# Patient Record
Sex: FEMALE | Race: WHITE
[De-identification: names, ages, dates, MRNs, and addresses within clinical notes are randomized per-mention and may not be internally consistent; named-entity substitution may affect disease eponyms.]

---

## 2017-10-08 ENCOUNTER — HOSPITAL ENCOUNTER (EMERGENCY)
Dept: HOSPITAL 76 - ED | Age: 27
Discharge: HOME | End: 2017-10-08
Payer: MEDICAID

## 2017-10-08 VITALS — SYSTOLIC BLOOD PRESSURE: 106 MMHG | DIASTOLIC BLOOD PRESSURE: 61 MMHG

## 2017-10-08 DIAGNOSIS — H10.32: ICD-10-CM

## 2017-10-08 DIAGNOSIS — B96.89: ICD-10-CM

## 2017-10-08 DIAGNOSIS — J06.9: Primary | ICD-10-CM

## 2017-10-08 DIAGNOSIS — J01.00: ICD-10-CM

## 2017-10-08 PROCEDURE — 99283 EMERGENCY DEPT VISIT LOW MDM: CPT

## 2017-10-08 NOTE — ED PHYSICIAN DOCUMENTATION
PD HPI HEENT





- Stated complaint


Stated Complaint: LEFT EYE PAIN,LEFT EAR ACHE





- Chief complaint


Chief Complaint: General





- History obtained from


History obtained from: Patient





- History of Present Illness


Timing - onset: How many days ago (4)


Timing - duration: Days (4)


Timing - details: Gradual onset, Still present


Location: Left ear, Sinuses, Other (left eye discharge today)


Associated symptoms: Fever, Congestion, Other (left eye discharge).  No: Facial 

swelling


Recently seen: Not recently seen





Review of Systems


Constitutional: reports: Fever.  denies: Chills


Ears: reports: Ear pain.  denies: Loss of hearing, Drainage/discharge, Tinnitus/

ringing


Nose: reports: Rhinorrhea / runny nose, Sinus pressure / pain (left maxillary)


Throat: denies: Dental pain / toothache, Sore throat


Respiratory: denies: Cough





PD PAST MEDICAL HISTORY





- Past Medical History


Past Medical History: No





- Past Surgical History


Past Surgical History: No





- Present Medications


Home Medications: 


 Ambulatory Orders











 Medication  Instructions  Recorded  Confirmed


 


Cephalexin [Keflex] 500 mg PO TID #20 capsule 10/08/17 


 


Cetirizine [ZyrTEC] 20 mg PO DAILY 10/08/17 10/08/17


 


Dexamethasone [Decadron] 4 mg PO DAILY #5 tablet 10/08/17 














- Allergies


Allergies/Adverse Reactions: 


 Allergies











Allergy/AdvReac Type Severity Reaction Status Date / Time


 


amoxicillin Allergy  Hives Verified 10/08/17 21:36


 


coconut oil Allergy  Hives Verified 10/08/17 21:36














- Social History


Does the pt smoke?: No


Smoking Status: Never smoker


Does the pt drink ETOH?: No


Does the pt have substance abuse?: No





- Immunizations


Immunizations are current?: Yes





PD ED PE NORMAL





- Vitals


Vital signs reviewed: Yes





- General


General: Alert and oriented X 3, No acute distress, Well developed/nourished





- HEENT


HEENT: PERRL, EOMI (left eye with conjunctival redness and purulent crusting/

matting. ), Ears normal, Pharynx benign





- Neck


Neck: Supple, no meningeal sign, No adenopathy





- Cardiac


Cardiac: RRR, No murmur





- Respiratory


Respiratory: Clear bilaterally





Results





- Vitals


Vitals: 


 Oxygen











O2 Source                      Room air

















PD MEDICAL DECISION MAKING





- ED course


Complexity details: considered differential, d/w patient





Departure





- Departure


Disposition: 01 Home, Self Care


Clinical Impression: 


Upper respiratory infection


Qualifiers:


 URI type: unspecified URI Qualified Code(s): J06.9 - Acute upper respiratory 

infection, unspecified





Conjunctivitis, acute


Qualifiers:


 Acute conjunctivitis type: bacterial Laterality: left Qualified Code(s): 

H10.32 - Unspecified acute conjunctivitis, left eye





Sinusitis, acute maxillary


Qualifiers:


 Recurrence: non-recurrent Qualified Code(s): J01.00 - Acute maxillary sinusitis

, unspecified





Condition: Stable


Record reviewed to determine appropriate education?: Yes


Instructions:  ED Conjunctivitis Bacterial, ED Sinusitis Abx Tx


Prescriptions: 


Cephalexin [Keflex] 500 mg PO TID #20 capsule


Dexamethasone [Decadron] 4 mg PO DAILY #5 tablet


Comments: 


 It sounds like generally an upper respiratory infection with then secondarily 

a sinus infection likely and pinkeye for sure.  Use the antibiotic eyedrops in 

the left eye every 2-3 hours while awake for the next several days until 

cleared.  For the inflammation through the sinuses and bronchioles, he can use 

dexamethasone daily for the next several days until improved.  Drink lots of 

fluids.  Tylenol or ibuprofen if needed for fevers and pains.  As it does sound 

like some sinus infection as well, use cephalexin 3 times daily for the next 5-

7 days until it seems cleared.  Recheck if not improving over the next 2-3 

days.  You will be okay to resume work after a day on the antibiotic eyedrops 

regarding the pinkeye so okay to resume work on Tuesday.


Discharge Date/Time: 10/08/17 22:50

## 2018-05-04 ENCOUNTER — HOSPITAL ENCOUNTER (EMERGENCY)
Dept: HOSPITAL 76 - ED | Age: 28
Discharge: HOME | End: 2018-05-04
Payer: MEDICAID

## 2018-05-04 VITALS — SYSTOLIC BLOOD PRESSURE: 125 MMHG | DIASTOLIC BLOOD PRESSURE: 84 MMHG

## 2018-05-04 DIAGNOSIS — H66.001: Primary | ICD-10-CM

## 2018-05-04 DIAGNOSIS — M79.7: ICD-10-CM

## 2018-05-04 PROCEDURE — 99283 EMERGENCY DEPT VISIT LOW MDM: CPT

## 2018-09-15 ENCOUNTER — HOSPITAL ENCOUNTER (OUTPATIENT)
Dept: HOSPITAL 76 - DI | Age: 28
Discharge: HOME | End: 2018-09-15
Attending: ORAL & MAXILLOFACIAL SURGERY
Payer: MEDICAID

## 2018-09-15 DIAGNOSIS — J31.0: Primary | ICD-10-CM

## 2018-09-15 DIAGNOSIS — R51: ICD-10-CM

## 2018-09-15 PROCEDURE — 70486 CT MAXILLOFACIAL W/O DYE: CPT

## 2018-09-15 NOTE — CT REPORT
Reason:  RHINITIS, FACIAL PAIN

Procedure Date:  09/15/2018   

Accession Number:  [id number] / [id number]                    

Procedure:  CT  - Sinuses CPT Code:  

 

FULL RESULT:

 

 

EXAM:

CT SINUS

 

EXAM DATE: 9/15/2018 03:42 PM.

 

HISTORY: Rhinitis. Facial and nasal pain.

 

COMPARISONS: None.

 

TECHNIQUE: Routine multi-axial CT imaging performed through the sinuses. 

Iodinated IV contrast: None. Reconstructions: Coronal.

 

In accordance with CT protocol optimization, one or more of the following 

dose reduction techniques were utilized for this exam: automated exposure 

control, adjustment of mA and/or KV based on patient size, or use of 

iterative reconstructive technique.

 

FINDINGS:

RIGHT

Frontal: Normal.

Ethmoid: Normal.

Maxillary: Normal.

Sphenoid: Normal.

Drainage Pathways: The frontal recess, ostiomeatal complex and 

sphenoethmoidal recess are patent and normal.

 

LEFT

Frontal: Normal.

Ethmoid: Normal.

Maxillary: Normal.

Sphenoid: Normal.

Drainage Pathways: The frontal recess, ostiomeatal complex and 

sphenoethmoidal recess are patent and normal.

 

Nasal Cavity: Normal. No mass or significant anatomic abnormality evident.

 

Osseous Structures: Unremarkable.

 

Orbits: Unremarkable.

 

Other: None.

IMPRESSION: Normal Sinus CT. No sinusitis.

 

RADIA

## 2018-11-04 ENCOUNTER — HOSPITAL ENCOUNTER (OUTPATIENT)
Dept: HOSPITAL 76 - ED | Age: 28
Setting detail: OBSERVATION
LOS: 1 days | Discharge: HOME | End: 2018-11-05
Attending: NURSE PRACTITIONER | Admitting: INTERNAL MEDICINE
Payer: MEDICAID

## 2018-11-04 DIAGNOSIS — R10.31: Primary | ICD-10-CM

## 2018-11-04 DIAGNOSIS — E87.6: ICD-10-CM

## 2018-11-04 LAB
ALBUMIN DIAFP-MCNC: 4.4 G/DL (ref 3.2–5.5)
ALBUMIN/GLOB SERPL: 1.4 {RATIO} (ref 1–2.2)
ALP SERPL-CCNC: 61 IU/L (ref 42–121)
ALT SERPL W P-5'-P-CCNC: 16 IU/L (ref 10–60)
ANION GAP SERPL CALCULATED.4IONS-SCNC: 8 MMOL/L (ref 6–13)
AST SERPL W P-5'-P-CCNC: 21 IU/L (ref 10–42)
BASOPHILS NFR BLD AUTO: 0.1 10^3/UL (ref 0–0.1)
BASOPHILS NFR BLD AUTO: 0.7 %
BILIRUB BLD-MCNC: 0.6 MG/DL (ref 0.2–1)
BUN SERPL-MCNC: 13 MG/DL (ref 6–20)
CALCIUM UR-MCNC: 9 MG/DL (ref 8.5–10.3)
CHLORIDE SERPL-SCNC: 106 MMOL/L (ref 101–111)
CLARITY UR REFRACT.AUTO: CLEAR
CO2 SERPL-SCNC: 24 MMOL/L (ref 21–32)
CREAT SERPLBLD-SCNC: 0.6 MG/DL (ref 0.4–1)
EOSINOPHIL # BLD AUTO: 0.1 10^3/UL (ref 0–0.7)
EOSINOPHIL NFR BLD AUTO: 0.6 %
ERYTHROCYTE [DISTWIDTH] IN BLOOD BY AUTOMATED COUNT: 13.2 % (ref 12–15)
GFRSERPLBLD MDRD-ARVRAT: 117 ML/MIN/{1.73_M2} (ref 89–?)
GLOBULIN SER-MCNC: 3.1 G/DL (ref 2.1–4.2)
GLUCOSE SERPL-MCNC: 112 MG/DL (ref 70–100)
GLUCOSE UR QL STRIP.AUTO: NEGATIVE MG/DL
HCG UR QL: NEGATIVE
HGB UR QL STRIP: 13.6 G/DL (ref 12–16)
KETONES UR QL STRIP.AUTO: NEGATIVE MG/DL
LIPASE SERPL-CCNC: 36 U/L (ref 22–51)
LYMPHOCYTES # SPEC AUTO: 2.9 10^3/UL (ref 1.5–3.5)
LYMPHOCYTES NFR BLD AUTO: 27.2 %
MCH RBC QN AUTO: 30.2 PG (ref 27–31)
MCHC RBC AUTO-ENTMCNC: 33.8 G/DL (ref 32–36)
MCV RBC AUTO: 89.3 FL (ref 81–99)
MONOCYTES # BLD AUTO: 0.5 10^3/UL (ref 0–1)
MONOCYTES NFR BLD AUTO: 4.8 %
NEUTROPHILS # BLD AUTO: 7 10^3/UL (ref 1.5–6.6)
NEUTROPHILS # SNV AUTO: 10.5 X10^3/UL (ref 4.8–10.8)
NEUTROPHILS NFR BLD AUTO: 66.7 %
NITRITE UR QL STRIP.AUTO: NEGATIVE
PDW BLD AUTO: 8.7 FL (ref 7.9–10.8)
PH UR STRIP.AUTO: 6 PH (ref 5–7.5)
PLATELET # BLD: 283 10^3/UL (ref 130–450)
PROT SPEC-MCNC: 7.5 G/DL (ref 6.7–8.2)
PROT UR STRIP.AUTO-MCNC: NEGATIVE MG/DL
RBC # UR STRIP.AUTO: (no result) /UL
RBC MAR: 4.51 10^6/UL (ref 4.2–5.4)
SODIUM SERPLBLD-SCNC: 138 MMOL/L (ref 135–145)
SP GR UR STRIP.AUTO: 1.01 (ref 1–1.03)
UROBILINOGEN UR QL STRIP.AUTO: (no result) E.U./DL
UROBILINOGEN UR STRIP.AUTO-MCNC: NEGATIVE MG/DL

## 2018-11-04 PROCEDURE — 85018 HEMOGLOBIN: CPT

## 2018-11-04 PROCEDURE — 76705 ECHO EXAM OF ABDOMEN: CPT

## 2018-11-04 PROCEDURE — 84484 ASSAY OF TROPONIN QUANT: CPT

## 2018-11-04 PROCEDURE — 87086 URINE CULTURE/COLONY COUNT: CPT

## 2018-11-04 PROCEDURE — 99285 EMERGENCY DEPT VISIT HI MDM: CPT

## 2018-11-04 PROCEDURE — 83605 ASSAY OF LACTIC ACID: CPT

## 2018-11-04 PROCEDURE — 87040 BLOOD CULTURE FOR BACTERIA: CPT

## 2018-11-04 PROCEDURE — 99283 EMERGENCY DEPT VISIT LOW MDM: CPT

## 2018-11-04 PROCEDURE — 84100 ASSAY OF PHOSPHORUS: CPT

## 2018-11-04 PROCEDURE — 74177 CT ABD & PELVIS W/CONTRAST: CPT

## 2018-11-04 PROCEDURE — 96366 THER/PROPH/DIAG IV INF ADDON: CPT

## 2018-11-04 PROCEDURE — 74176 CT ABD & PELVIS W/O CONTRAST: CPT

## 2018-11-04 PROCEDURE — 96372 THER/PROPH/DIAG INJ SC/IM: CPT

## 2018-11-04 PROCEDURE — 80053 COMPREHEN METABOLIC PANEL: CPT

## 2018-11-04 PROCEDURE — 85025 COMPLETE CBC W/AUTO DIFF WBC: CPT

## 2018-11-04 PROCEDURE — 93005 ELECTROCARDIOGRAM TRACING: CPT

## 2018-11-04 PROCEDURE — 83690 ASSAY OF LIPASE: CPT

## 2018-11-04 PROCEDURE — 81003 URINALYSIS AUTO W/O SCOPE: CPT

## 2018-11-04 PROCEDURE — 85014 HEMATOCRIT: CPT

## 2018-11-04 PROCEDURE — 96375 TX/PRO/DX INJ NEW DRUG ADDON: CPT

## 2018-11-04 PROCEDURE — 85651 RBC SED RATE NONAUTOMATED: CPT

## 2018-11-04 PROCEDURE — 86140 C-REACTIVE PROTEIN: CPT

## 2018-11-04 PROCEDURE — 36415 COLL VENOUS BLD VENIPUNCTURE: CPT

## 2018-11-04 PROCEDURE — 99284 EMERGENCY DEPT VISIT MOD MDM: CPT

## 2018-11-04 PROCEDURE — 96376 TX/PRO/DX INJ SAME DRUG ADON: CPT

## 2018-11-04 PROCEDURE — 96367 TX/PROPH/DG ADDL SEQ IV INF: CPT

## 2018-11-04 PROCEDURE — 85610 PROTHROMBIN TIME: CPT

## 2018-11-04 PROCEDURE — 82272 OCCULT BLD FECES 1-3 TESTS: CPT

## 2018-11-04 PROCEDURE — 96365 THER/PROPH/DIAG IV INF INIT: CPT

## 2018-11-04 PROCEDURE — 71275 CT ANGIOGRAPHY CHEST: CPT

## 2018-11-04 PROCEDURE — 81025 URINE PREGNANCY TEST: CPT

## 2018-11-04 PROCEDURE — 81001 URINALYSIS AUTO W/SCOPE: CPT

## 2018-11-04 PROCEDURE — 82150 ASSAY OF AMYLASE: CPT

## 2018-11-04 PROCEDURE — 96361 HYDRATE IV INFUSION ADD-ON: CPT

## 2018-11-04 PROCEDURE — 83735 ASSAY OF MAGNESIUM: CPT

## 2018-11-04 RX ADMIN — SODIUM CHLORIDE AND POTASSIUM CHLORIDE SCH MLS/HR: 9; 1.49 INJECTION, SOLUTION INTRAVENOUS at 23:01

## 2018-11-04 NOTE — HISTORY & PHYSICAL EXAMINATION
Chief Complaint





- Chief Complaint


Chief Complaint: Abdominal Pain





History of Present Illness





- Admitted From


Admitted From:: Emergency Department





- History Obtained From


Records Reviewed: Yes


History obtained from: Patient


Exam Limitations: None





- History of Present Illness


HPI Comment/Other: 





Patient is a very healthy 28-year-old female with past medical history of 

allergic sinusitis who presented to the emergency department with a chief 

complaint of abdominal pain.  The patient states that the abdominal pain started

all of a sudden this afternoon while she was driving back to her home from 

[place], [place].  She states that prior to this she had noticed some 

right lower quadrant aching which had been going on off and on for several 

months but she did not make much of it.  She also states that she has noticed 

that over the last month and a half she has been having blood in her stools.  

She states that it is right red blood but she has normal formed bowel movements 

with the blood in her stool.  The patient denies any diarrhea or constipation.  

She does state that the blood has become more pronounced over the last 3 days.  

She states that today she had an acute onset of severe right lower quadrant ab

dominal pain.  She states it initially started as an ache similar to what she 

has had in the past but then became sharp and severe.  She states that she had 

to pull over in the side of the road while she was driving because the pain was 

so severe.  She states that she had cold sweats and nausea associated with the 

pain.  She states that the pain almost brought her to tears.  She states it was 

a 10 out of 10 pain.  She states that nothing that she did helped to improve the

pain.  She states that somehow she was able to drive herself back to [place] 

and then come into the emergency department.  She denies any fevers or chills.  

She denies any history of bowel issues.  She denies any have family history of 

irritable bowel or inflammatory bowel syndrome.  The patient denies eating any 

unusual foods over the last 24 hours.  She states that the pain occurred several

hours after she ate.  She states that the pain remained constant until she 

arrived in the emergency department and was given pain medication.





Patient denies any headaches, blurred vision, runny nose, sore throat, nasal 

congestion, difficulty swallowing, changes in her appetite, recent unintentional

weight loss, shortness of air, orthopnea, PND, cough, increased lower extremity 

swelling, chest pain, vomiting, constipation, diarrhea, black stools, urinary 

urgency, urinary frequency, dysuria, joint pain, joint swelling, muscle aches, 

back pain, neck stiffness, lightheadedness, dizziness, ringing in her ears, 

recent stress or depression, night sweats or any focal neurologic deficits.





On presentation to the emergency department the patient was afebrile and 

slightly hypertensive with a blood pressure of 139/75 otherwise she had no 

respiratory distress and was saturating 100% on room air.  The patient did 

appear to be writhing in pain and immediately was given a dose of IV Toradol.  

The patient underwent routine lab work which revealed a mild hypokalemia but 

remainder of her lab work was all within normal limits.  The patient had a 

negative troponin and normal C-reactive protein, LFTs and ESR.  The patient's w

eugene blood cell count was 10.5.  The patient's urine analysis was negative.  The

patient underwent multiple imaging studies including a CT of her abdomen and 

pelvis without contrast which was unremarkable.  She then underwent an abdominal

ultrasound which was also unremarkable.  She then underwent a CT with contrast 

of her thorax and her abdomen and pelvis this was also unremarkable.  The 

patient continued to have abdominal pain and required 2 additional doses of IV 

Dilaudid but pain persisted.  At this point the emergency room physician became 

concerned for possible inflammatory bowel disease in a patient who is been 

having hematochezia and now has acute onset of abdominal pain.  The emergency 

room physician contacted the surgeon on call Dr. Metz who recommended that 

the patient be placed in observation and admitted by the hospitalist team and 

that he would see the patient in the morning in consultation.  He also 

recommended giving her a dose of antibiotics in the emergency department.  The 

patient was given IV ceftriaxone and Flagyl in the emergency department.  The 

patient was placed in observation for abdominal pain of unknown etiology.





History





- Past Medical History


Cardiovascular: reports: None


Respiratory: reports: None


Neuro: reports: None


Endocrine/Autoimmune: reports: None


GI: reports: Other (Hematochezia )


GYN: reports: None


: reports: None


HEENT: reports: None


Psych: reports: None


Musculoskeletal: reports: Fibromyalgia


MRSA Hx?: No


Other Past Medical History: Allergic sinusitis





- Family & Social History


Family History: Mother: Alcoholism, Mental Illness, Father: CAD (GF had CAD late

in life), Other family: CAD


Living arrangement: At home


Living Situation: With spouse/s.o.


Social History Notes: The patient lives in [place] with her boyfriend.  They 

do not have any children or any pets.  The patient is originally from [place]

but grew up in [place], [place].  She worked in behavioral health at 

 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  She states that she is very 

active and was running half marathons and marathons until the last year.  She 

states that her body tired out from running and that she has become less active 

now but still tries to take care of herself.  She denies any tobacco use, alco

hol use or any drug use.





- POLST


Patient has POLST: No


POLST Status: Full Code





Meds/Allgy





- Home Medications


Home Medications: 


                                Ambulatory Orders











 Medication  Instructions  Recorded  Confirmed


 


No Known Home Medications  11/04/18 11/04/18














- Allergies


Allergies/Adverse Reactions: 


                                    Allergies











Allergy/AdvReac Type Severity Reaction Status Date / Time


 


amoxicillin Allergy  Hives Verified 11/04/18 15:56


 


coconut oil Allergy  Hives Verified 11/04/18 15:56














Review of Systems





- Other Findings


Other Findings: 





A comprehensive review of systems was performed the pertinent positives and 

negatives are stated above in the HPI and the remainder of the review of systems

is negative.


Prior Level of Functionality: 





Patient is completely independent with all her activities of daily living.





Exam





- Vital Signs


Reviewed Vital Signs: Yes


Vital Signs: 





                                Vital Signs x48h











  Temp Pulse Resp BP Pulse Ox


 


 11/04/18 21:08   63  18  124/75  99


 


 11/04/18 19:34   70  18  115/73  100


 


 11/04/18 17:57   63  18  113/73  100


 


 11/04/18 16:54   59 L  18  111/72  100


 


 11/04/18 15:52  36.3 C L  72  18  139/75 H  100














- Physical Exam


General Appearance: positive: Alert, Moderate distress (Patient appears to be 

writhing in pain, uncomfortable, changing positions but unable to get 

comfortable)


Eyes Bilateral: positive: Normal inspection, PERRL, EOMI, No lid inflammation, 

Conjunctivae nml, No scleral icterus


ENT: positive: ENT inspection nml, Pharynx nml, Dry mucous membranes.  negative:

Purulent nasal drainage, Pharyngeal erythema, Oral lesions


Neck: positive: Nml inspection, Thyroid nml, No JVD, Trachea midline.  negative:

Thyromegaly, Lymphadenopathy (R), Lymphadenopathy (L), Stiff neck, Carotid 

bruit, Tracheal deviation


Respiratory: positive: Chest non-tender, No respiratory distress, Breath sounds 

nml.  negative: Wheezes, Rales, Rhonchi


Cardiovascular: positive: Regular rate & rhythm, No murmur, No gallop


Peripheral Pulses: positive: 2+


Abdomen: positive: No organomegaly, Nml bowel sounds, No distention, Tenderness 

(Mild RLQ tenderness, abd is soft, non distended without any peritoneal signs.).

 negative: Guarding, Rebound, Hepatomegaly


Back: positive: Nml inspection.  negative: CVA tenderness (R), CVA tenderness 

(L)


Skin: positive: Color nml, No rash, Warm, Dry.  negative: Cyanosis, Diaphoresis,

Pallor, Skin rash


Extremities: positive: Non-tender, Full ROM, Nml appearance, No pedal edema


Neurologic/Psychiatric: positive: Oriented x3, CN's nml (2-12), Motor nml, 

Sensation nml, Mood/affect nml





Conclusion/Plan





- Problem List


(1) Abdominal pain


Conclusion/Plan: 





Patient presented with abdominal pain with sudden onset 3 hours prior to arrival

to the emergency department. The pain is located in the right lower quadrant and

has been persistent since it started.  Patient had previous episodes of right 

lower quadrant aching pain which was short-lived and has been occurring off and 

on for the last few months. Patient underwent routine lab tests which were all 

within normal limits.  Patient underwent CT of the abdomen with and without 

contrast as well as CT of the chest with contrast and abdominal ultrasound.  All

studies were within normal limits. Patient also stated that she has had hema

tochezia for the last month and a half that is been worsening over the last 3 

days.  She continues to have formed stools however and has no diarrhea or 

constipation.  Patient had uncontrolled pain in the emergency department despite

several doses of IV pain medication the patient continued to have severe pain.  

The etiology of the patient's abdominal pain is unclear at this point.  Imaging 

studies have been negative and all her lab tests are within normal limits.  

Given her history of hematochezia I am concerned for possibility of inflammatory

bowel disease although this is an unusual presentation with normal ESR and CRP 

and normal imaging. Patient will need Colonoscopy and biopsy to confirm 

diagnosis.  I am also concerned about possibility of psychosomatic pain as the 

patient's pain appears out of proportion to examination and appears very 

inconsistent.  For example patient will be writhing in pain one moment and then 

speaking to me comfortably, laughing and smiling the next moment and then back 

in severe writhing pain the next moment.  It does not appear the patient has 

previous ER visits or hospitalizations for this kind of pain or any other pain 

issues.  She is not on any chronic pain medications and does not appear to be 

pain seeking. This is also in the setting of a patient who has a family history 

of mental health issues in her mother and grew up with an alcoholic mother which

was likely a traumatic experience for her.





Plan:


IV fluids


Repeat routine labs


Surgery consult


IV Dilaudid for pain control


IV antiemetics for nausea control


Consider outpatient psych assessment if no etiology found for patients pain. 


Qualifiers: 


   Abdominal location: right lower quadrant   Qualified Code(s): R10.31 - Right 

lower quadrant pain   





(2) Hypokalemia


Conclusion/Plan: 


On presentation to the emergency department the patient's potassium is 3.4.  It 

is likely decreased secondary to nausea.  We will replace the patient's 

potassium and monitor her potassium daily.








- Lab Results


Lab results reviewed: Yes


Fish Bones: 


                                 11/04/18 16:17





                                 11/04/18 16:17


Other Lab Results: 








                               Laboratory Results











WBC  10.5 x10^3/uL (4.8-10.8)   11/04/18  16:17    


 


RBC  4.51 10^6/uL (4.20-5.40)   11/04/18  16:17    


 


Hgb  13.6 g/dL (12.0-16.0)   11/04/18  16:17    


 


Hct  40.3 % (37.0-47.0)   11/04/18  16:17    


 


MCV  89.3 fL (81.0-99.0)   11/04/18  16:17    


 


MCH  30.2 pg (27.0-31.0)   11/04/18  16:17    


 


MCHC  33.8 g/dL (32.0-36.0)   11/04/18  16:17    


 


RDW  13.2 % (12.0-15.0)   11/04/18  16:17    


 


Plt Count  283 10^3/uL (130-450)   11/04/18  16:17    


 


MPV  8.7 fL (7.9-10.8)   11/04/18  16:17    


 


Neut # (Auto)  7.0 10^3/uL (1.5-6.6)  H  11/04/18  16:17    


 


Lymph # (Auto)  2.9 10^3/uL (1.5-3.5)   11/04/18  16:17    


 


Mono # (Auto)  0.5 10^3/uL (0.0-1.0)   11/04/18  16:17    


 


Eos # (Auto)  0.1 10^3/uL (0.0-0.7)   11/04/18  16:17    


 


Baso # (Auto)  0.1 10^3/uL (0.0-0.1)   11/04/18  16:17    


 


Absolute Nucleated RBC  0.01 x10^3/uL  11/04/18  16:17    


 


Nucleated RBC %  0.1 /100WBC  11/04/18  16:17    


 


Sodium  138 mmol/L (135-145)   11/04/18  16:17    


 


Potassium  3.4 mmol/L (3.5-5.0)  L  11/04/18  16:17    


 


Chloride  106 mmol/L (101-111)   11/04/18  16:17    


 


Carbon Dioxide  24 mmol/L (21-32)   11/04/18  16:17    


 


Anion Gap  8.0  (6-13)   11/04/18  16:17    


 


BUN  13 mg/dL (6-20)   11/04/18  16:17    


 


Creatinine  0.6 mg/dL (0.4-1.0)   11/04/18  16:17    


 


Estimated GFR (MDRD)  117  (>89)   11/04/18  16:17    


 


Glucose  112 mg/dL ()  H  11/04/18  16:17    


 


Calcium  9.0 mg/dL (8.5-10.3)   11/04/18  16:17    


 


Total Bilirubin  0.6 mg/dL (0.2-1.0)   11/04/18  16:17    


 


AST  21 IU/L (10-42)   11/04/18  16:17    


 


ALT  16 IU/L (10-60)   11/04/18  16:17    


 


Alkaline Phosphatase  61 IU/L ()   11/04/18  16:17    


 


Total Protein  7.5 g/dL (6.7-8.2)   11/04/18  16:17    


 


Albumin  4.4 g/dL (3.2-5.5)   11/04/18  16:17    


 


Globulin  3.1 g/dL (2.1-4.2)   11/04/18  16:17    


 


Albumin/Globulin Ratio  1.4  (1.0-2.2)   11/04/18  16:17    


 


Lipase  36 U/L (22-51)   11/04/18  16:17    


 


Urine Color  YELLOW   11/04/18  16:05    


 


Urine Clarity  CLEAR  (CLEAR)   11/04/18  16:05    


 


Urine pH  6.0 PH (5.0-7.5)   11/04/18  16:05    


 


Ur Specific Gravity  1.010  (1.002-1.030)   11/04/18  16:05    


 


Urine Protein  NEGATIVE mg/dL (NEGATIVE)   11/04/18  16:05    


 


Urine Glucose (UA)  NEGATIVE mg/dL (NEGATIVE)   11/04/18  16:05    


 


Urine Ketones  NEGATIVE mg/dL (NEGATIVE)   11/04/18  16:05    


 


Urine Occult Blood  TRACE-INTA  (NEGATIVE)   11/04/18  16:05    


 


Urine Nitrite  NEGATIVE  (NEGATIVE)   11/04/18  16:05    


 


Urine Bilirubin  NEGATIVE  (NEGATIVE)   11/04/18  16:05    


 


Urine Urobilinogen  0.2 (NORMAL) E.U./dL (NORMAL)   11/04/18  16:05    


 


Ur Leukocyte Esterase  NEGATIVE  (NEGATIVE)   11/04/18  16:05    


 


Ur Microscopic Review  NOT INDICATED   11/04/18  16:05    


 


Urine Culture Comments  NOT INDICATED   11/04/18  16:05    


 


Urine HCG, Qual  NEGATIVE   11/04/18  16:05    














- Diagnostic Imaging Results


Diagnostic Imaging Results: positive: Final report reviewed





- EKG Results


EKG Interpreted Independently: Yes





Core Measures





- Anticipated LOS


I expect patient to be DC'd or transferred within 96 hours.: Yes





- DVT/VTE - Prophylaxis


VTE/DVT Prophylaxis med ordered at admit?: Yes

## 2018-11-04 NOTE — CT REPORT
Reason:  R flank pain

Procedure Date:  11/04/2018   

Accession Number:  [id number] / [id number]                    

Procedure:  CT  - Abdomen/Pelvis W/O CPT Code:  

 

FULL RESULT:

 

 

EXAM:

CT ABDOMEN AND PELVIS

 

EXAM DATE: 11/4/2018 04:40 PM.

 

CLINICAL HISTORY: R flank pain.

 

COMPARISONS: None.

 

TECHNIQUE: Routine axial helical CT imaging was performed through the 

abdomen and pelvis without IV contrast. Reconstructions: Coronal and 

sagittal.

 

In accordance with CT protocol optimization, one or more of the following 

dose reduction techniques were utilized for this exam: automated exposure 

control, adjustment of mA and/or KV based on patient size, or use of 

iterative reconstructive technique.

 

FINDINGS:

Lung Bases: Unremarkable.

 

Abdominal Organs: Noncontrast images of the abdominal organs are grossly 

unremarkable.

 

Gallbladder/bile ducts: No significant abnormalities.

 

Peritoneal Cavity: No free fluid, free air or salma adenopathy. Bowel is 

grossly unremarkable.

 

Pelvic Organs: No bladder stones or wall thickening. Noncontrast images 

of the visualized pelvic organs are unremarkable.

 

Vasculature: Unremarkable.

 

Other: None.

IMPRESSION:

 Negative noncontrast CT of the abdomen and pelvis.

## 2018-11-04 NOTE — ED PHYSICIAN DOCUMENTATION
PD HPI ABD PAIN





- Stated complaint


Stated Complaint: RT UPPER BACK PX/NAUESA





- Chief complaint


Chief Complaint: Abd Pain





- History obtained from


History obtained from: Patient





- History of Present Illness


Timing - onset: Today (This is a healthy 28-year-old woman with no history of 

abdominal surgeries who developed right flank pain that was sudden in onset 

about 3 hours ago associated with nausea no vomiting.  She has chronic 

hematochezia which has been worse lately but is not new.  Prior to that today 

she had no abdominal pain and felt fine.  She is never had a kidney stone or 

biliary colic.  Last menses was a little under a month ago and normal bleed at 

this juncture.  She is sexually active.)





Review of Systems


Ten Systems: 10 systems reviewed and negative


Constitutional: denies: Fever, Chills


Cardiac: denies: Chest pain / pressure, Palpitations


Respiratory: denies: Dyspnea, Cough


GI: reports: Abdominal Pain, Nausea.  denies: Vomiting


: denies: Dysuria





PD PAST MEDICAL HISTORY





- Past Medical History


Musculoskeletal: Fibromyalgia





- Past Surgical History


Past Surgical History: No





- Present Medications


Home Medications: 


                                Ambulatory Orders











 Medication  Instructions  Recorded  Confirmed


 


No Known Home Medications  11/04/18 11/04/18














- Allergies


Allergies/Adverse Reactions: 


                                    Allergies











Allergy/AdvReac Type Severity Reaction Status Date / Time


 


amoxicillin Allergy  Hives Verified 11/04/18 15:56


 


coconut oil Allergy  Hives Verified 11/04/18 15:56














- Social History


Does the pt smoke?: No


Smoking Status: Never smoker


Does the pt drink ETOH?: Yes


Does the pt have substance abuse?: No





- Immunizations


Immunizations are current?: Yes





PD ED PE NORMAL





- Vitals


Vital signs reviewed: Yes





- General


General: Alert and oriented X 3, Other (She is tearful and in pain)





- HEENT


HEENT: PERRL, EOMI





- Neck


Neck: Supple, no meningeal sign, No bony TTP





- Cardiac


Cardiac: RRR, No murmur





- Respiratory


Respiratory: No respiratory distress, Clear bilaterally





- Abdomen


Abdomen: Normal bowel sounds, Soft, Other (Mild right-sided abdominal tenderness

without surgical signs)





- Back


Back: No CVA TTP, No spinal TTP





- Derm


Derm: Normal color, Warm and dry





- Extremities


Extremities: No edema, No calf tenderness / cord





- Neuro


Neuro: Alert and oriented X 3, Normal speech





Results





- Vitals


Vitals: 


                               Vital Signs - 24 hr











  11/04/18 11/04/18 11/04/18





  15:52 16:54 17:57


 


Temperature 36.3 C L  


 


Heart Rate 72 59 L 63


 


Respiratory 18 18 18





Rate   


 


Blood Pressure 139/75 H 111/72 113/73


 


O2 Saturation 100 100 100














  11/04/18 11/04/18





  19:34 21:08


 


Temperature  


 


Heart Rate 70 63


 


Respiratory 18 18





Rate  


 


Blood Pressure 115/73 124/75


 


O2 Saturation 100 99








                                     Oxygen











O2 Source                      Room air

















- Labs


Labs: 


                                Laboratory Tests











  11/04/18 11/04/18 11/04/18





  16:05 16:15 16:15


 


WBC   


 


RBC   


 


Hgb   


 


Hct   


 


MCV   


 


MCH   


 


MCHC   


 


RDW   


 


Plt Count   


 


MPV   


 


Neut # (Auto)   


 


Lymph # (Auto)   


 


Mono # (Auto)   


 


Eos # (Auto)   


 


Baso # (Auto)   


 


Absolute Nucleated RBC   


 


Nucleated RBC %   


 


ESR   11 


 


Sodium   


 


Potassium   


 


Chloride   


 


Carbon Dioxide   


 


Anion Gap   


 


BUN   


 


Creatinine   


 


Estimated GFR (MDRD)   


 


Glucose   


 


Calcium   


 


Total Bilirubin   


 


AST   


 


ALT   


 


Alkaline Phosphatase   


 


C-Reactive Protein    < 1.0


 


Total Protein   


 


Albumin   


 


Globulin   


 


Albumin/Globulin Ratio   


 


Lipase   


 


Urine Color  YELLOW  


 


Urine Clarity  CLEAR  


 


Urine pH  6.0  


 


Ur Specific Gravity  1.010  


 


Urine Protein  NEGATIVE  


 


Urine Glucose (UA)  NEGATIVE  


 


Urine Ketones  NEGATIVE  


 


Urine Occult Blood  TRACE-INTA  


 


Urine Nitrite  NEGATIVE  


 


Urine Bilirubin  NEGATIVE  


 


Urine Urobilinogen  0.2 (NORMAL)  


 


Ur Leukocyte Esterase  NEGATIVE  


 


Ur Microscopic Review  NOT INDICATED  


 


Urine Culture Comments  NOT INDICATED  


 


Urine HCG, Qual  NEGATIVE  














  11/04/18 11/04/18





  16:17 16:17


 


WBC  10.5 


 


RBC  4.51 


 


Hgb  13.6 


 


Hct  40.3 


 


MCV  89.3 


 


MCH  30.2 


 


MCHC  33.8 


 


RDW  13.2 


 


Plt Count  283 


 


MPV  8.7 


 


Neut # (Auto)  7.0 H 


 


Lymph # (Auto)  2.9 


 


Mono # (Auto)  0.5 


 


Eos # (Auto)  0.1 


 


Baso # (Auto)  0.1 


 


Absolute Nucleated RBC  0.01 


 


Nucleated RBC %  0.1 


 


ESR  


 


Sodium   138


 


Potassium   3.4 L


 


Chloride   106


 


Carbon Dioxide   24


 


Anion Gap   8.0


 


BUN   13


 


Creatinine   0.6


 


Estimated GFR (MDRD)   117


 


Glucose   112 H


 


Calcium   9.0


 


Total Bilirubin   0.6


 


AST   21


 


ALT   16


 


Alkaline Phosphatase   61


 


C-Reactive Protein  


 


Total Protein   7.5


 


Albumin   4.4


 


Globulin   3.1


 


Albumin/Globulin Ratio   1.4


 


Lipase   36


 


Urine Color  


 


Urine Clarity  


 


Urine pH  


 


Ur Specific Gravity  


 


Urine Protein  


 


Urine Glucose (UA)  


 


Urine Ketones  


 


Urine Occult Blood  


 


Urine Nitrite  


 


Urine Bilirubin  


 


Urine Urobilinogen  


 


Ur Leukocyte Esterase  


 


Ur Microscopic Review  


 


Urine Culture Comments  


 


Urine HCG, Qual  














- Rads (name of study)


  ** CT KUB


Radiology: EMP read contemporaneously (Neg)





  ** CTA Chest and CT A/P with contrast


Radiology: EMP read contemporaneously (neg)





  ** RUQ sono


Radiology: EMP read contemporaneously (Neg)





PD MEDICAL DECISION MAKING





- ED course


ED course: 





28-year-old woman with sudden right flank pain radiating to the shoulder and 

lower abdomen with right-sided abdominal tenderness.  Initial concern was for 

renal colic at all, initial CT and labs were unremarkable.  She still had 

profound pain and this was followed by right upper quadrant ultrasound that was 

negative, and then a CTA of the chest to rule out lower pulmonary embolism and 

CT with contrast of the belly to consider other etiologies that would only show 

up with contrast such as but not limited to renal infarction this was also 

negative.


Given persistent severe pain offered observation in the hospital versus 

discharge and return in the morning if not better.  Her pain is still severe and

she opts for the former.  Spoke with Dr. Metz who will consult tomorrow but 

defers to medicine for the admission.  Call to Dr. Stringer for observation at 9:23

PM.





Departure





- Departure


Disposition: ED Place in Observation


Clinical Impression: 


 Abdominal pain





Condition: Stable


Discharge Date/Time: 11/04/18 22:15

## 2018-11-04 NOTE — CT REPORT
Reason:  IV only R back pain

Procedure Date:  11/04/2018   

Accession Number:  [id number] / [id number]                    

Procedure:  CT  - Chest Angio (PE) CPT Code:  

 

FULL RESULT:

 

 

EXAM:

CT ANGIOGRAM CHEST

 

EXAM DATE: 11/4/2018 08:20 PM.

 

CLINICAL HISTORY: Right back pain

 

COMPARISON: None.

 

TECHNIQUE: Routine helical imaging was performed through the chest in the 

pulmonary arterial phase. IV Contrast: ISOVUE 300  100mL. 

Reconstructions: Coronal 3-D MIP reconstructions.Sagittal and coronal.

 

In accordance with CT protocol optimization, one or more of the following 

dose reduction techniques were utilized for this exam: automated exposure 

control, adjustment of mA and/or KV based on patient size, or use of 

iterative reconstructive technique.

 

FINDINGS:

Pulmonary Arteries:

Diagnostic quality: Adequate through the segmental arteries. No evidence 

for acute or chronic pulmonary emboli.

 

RV/LV is within normal limits. There is no interventricular septal 

bowing. There is no reflux of contrast material in the IVC.

 

Lungs/Pleura: No consolidation, nodules, or edema. No effusions or 

pneumothorax.

 

Mediastinum: Normal. No cardiac enlargement or adenopathy.

 

Thoracic Aorta: Unremarkable.

 

Upper Abdomen: Unremarkable.

 

Other: None.

IMPRESSION: Normal pulmonary CT angiogram. No pulmonary emboli.

 

[facility]

## 2018-11-04 NOTE — CT REPORT
Reason:  IV only R flank pain, ?renal infarct

Procedure Date:  11/04/2018   

Accession Number:  [id number] / [id number]                    

Procedure:  CT  - Abdomen/Pelvis W/ CPT Code:  

 

FULL RESULT:

 

 

EXAM:

CT ABDOMEN AND PELVIS

 

EXAM DATE: 11/4/2018 08:05 PM.

 

CLINICAL HISTORY: Right flank pain

 

COMPARISONS: ABDOMEN/PELVIS W/O 11/04/2018 4:40 PM.

 

TECHNIQUE: Routine helical CT imaging was performed through the abdomen 

and pelvis. IV contrast: ISOVUE 300  100mL. Enteric contrast: No. 

Reconstructions: Coronal and sagittal.

 

In accordance with CT protocol optimization, one or more of the following 

dose reduction techniques were utilized for this exam: automated exposure 

control, adjustment of mA and/or KV based on patient size, or use of 

iterative reconstructive technique.

 

FINDINGS:

Lung Bases: Findings are detailed separately

 

Liver: Normal. No masses.

 

Gallbladder/Bile Ducts: Unremarkable.

 

Spleen: Normal.

 

Pancreas: Normal.

 

Adrenal Glands: Normal.

 

Kidneys: Normal. No masses or hydronephrosis.

 

Peritoneal Cavity/Bowel: Normal. No free fluid, free air or adenopathy. 

No masses or acute inflammatory process. The appendix is well visualized 

and normal.

 

Pelvic Organs: Normal. The bladder and visualized pelvic organs are 

within normal limits.

 

Vasculature: No aneurysms or other significant abnormality.

 

Bones: No significant abnormality.

 

Other: None.

IMPRESSION: Normal abdomen and pelvis CT.

 

[facility]

## 2018-11-04 NOTE — ULTRASOUND REPORT
Reason:  R abd pain, eval RUQ and if able for appy

Procedure Date:  11/04/2018   

Accession Number:  [id number] / [id number]                    

Procedure:  US  - Abdomen Limited CPT Code:  

 

FULL RESULT:

 

 

EXAM:

ABDOMEN ULTRASOUND LIMITED, RUQ

 

EXAM DATE: 11/4/2018 07:17 PM.

 

CLINICAL HISTORY: Right abd pain, eval RUQ and if able for appendicitis.

 

COMPARISON: CT from earlier the same day.

 

TECHNIQUE: Real-time scanning was performed with static images obtained.

 

FINDINGS:

Liver: Normal in size and echotexture. 13.9 cm. Main portal vein flow: 

Hepatopetal.

 

Gallbladder: Normal. No stones, wall thickening, or sonographic Cardenas's 

sign.

 

Biliary System: CBD measures 3 mm. No intrahepatic or extrahepatic ductal 

dilatation.

 

Other: The right kidney is unremarkable without hydronephrosis. The 

appendix is not visualized in the right lower quadrant or area of pain. 

No free fluid or fluid collection identified.

IMPRESSION:

1. Normal right upper quadrant ultrasound.

2. The appendix is not visualized but was normal on the CT from the same 

day.

 

[facility]

## 2018-11-05 VITALS — DIASTOLIC BLOOD PRESSURE: 63 MMHG | SYSTOLIC BLOOD PRESSURE: 114 MMHG

## 2018-11-05 LAB
ALBUMIN DIAFP-MCNC: 3.5 G/DL (ref 3.2–5.5)
ALBUMIN/GLOB SERPL: 1.3 {RATIO} (ref 1–2.2)
ALP SERPL-CCNC: 47 IU/L (ref 42–121)
ALT SERPL W P-5'-P-CCNC: 13 IU/L (ref 10–60)
AMYLASE SERPL-CCNC: 35 U/L (ref 28–100)
ANION GAP SERPL CALCULATED.4IONS-SCNC: 9 MMOL/L (ref 6–13)
AST SERPL W P-5'-P-CCNC: 19 IU/L (ref 10–42)
BASOPHILS NFR BLD AUTO: 0.1 10^3/UL (ref 0–0.1)
BASOPHILS NFR BLD AUTO: 0.7 %
BILIRUB BLD-MCNC: 0.7 MG/DL (ref 0.2–1)
BUN SERPL-MCNC: 7 MG/DL (ref 6–20)
CALCIUM UR-MCNC: 8.4 MG/DL (ref 8.5–10.3)
CHLORIDE SERPL-SCNC: 107 MMOL/L (ref 101–111)
CO2 SERPL-SCNC: 22 MMOL/L (ref 21–32)
CREAT SERPLBLD-SCNC: 0.6 MG/DL (ref 0.4–1)
CRP SERPL-MCNC: < 1 MG/DL (ref 0–1)
EOSINOPHIL # BLD AUTO: 0 10^3/UL (ref 0–0.7)
EOSINOPHIL NFR BLD AUTO: 0.5 %
ERYTHROCYTE [DISTWIDTH] IN BLOOD BY AUTOMATED COUNT: 13.5 % (ref 12–15)
GFRSERPLBLD MDRD-ARVRAT: 117 ML/MIN/{1.73_M2} (ref 89–?)
GLOBULIN SER-MCNC: 2.7 G/DL (ref 2.1–4.2)
GLUCOSE SERPL-MCNC: 117 MG/DL (ref 70–100)
HGB UR QL STRIP: 12.2 G/DL (ref 12–16)
HGB UR QL STRIP: 12.7 G/DL (ref 12–16)
INR PPP: 1.2 (ref 0.8–1.2)
LYMPHOCYTES # SPEC AUTO: 3 10^3/UL (ref 1.5–3.5)
LYMPHOCYTES NFR BLD AUTO: 30 %
MAGNESIUM SERPL-MCNC: 1.8 MG/DL (ref 1.7–2.8)
MCH RBC QN AUTO: 30.4 PG (ref 27–31)
MCHC RBC AUTO-ENTMCNC: 33.6 G/DL (ref 32–36)
MCV RBC AUTO: 90.5 FL (ref 81–99)
MONOCYTES # BLD AUTO: 0.6 10^3/UL (ref 0–1)
MONOCYTES NFR BLD AUTO: 5.5 %
NEUTROPHILS # BLD AUTO: 6.3 10^3/UL (ref 1.5–6.6)
NEUTROPHILS # SNV AUTO: 10 X10^3/UL (ref 4.8–10.8)
NEUTROPHILS NFR BLD AUTO: 63.3 %
PDW BLD AUTO: 8.4 FL (ref 7.9–10.8)
PHOSPHATE BLD-MCNC: 3 MG/DL (ref 2.5–4.6)
PLATELET # BLD: 247 10^3/UL (ref 130–450)
PROT SPEC-MCNC: 6.2 G/DL (ref 6.7–8.2)
PROTHROM ACT/NOR PPP: 13.8 SECS (ref 9.9–12.6)
RBC MAR: 4.01 10^6/UL (ref 4.2–5.4)
SODIUM SERPLBLD-SCNC: 138 MMOL/L (ref 135–145)

## 2018-11-05 RX ADMIN — SODIUM CHLORIDE, PRESERVATIVE FREE SCH: 5 INJECTION INTRAVENOUS at 01:11

## 2018-11-05 RX ADMIN — SODIUM CHLORIDE, PRESERVATIVE FREE SCH: 5 INJECTION INTRAVENOUS at 08:48

## 2018-11-05 RX ADMIN — SODIUM CHLORIDE AND POTASSIUM CHLORIDE SCH MLS/HR: 9; 1.49 INJECTION, SOLUTION INTRAVENOUS at 10:27

## 2018-11-05 NOTE — DISCHARGE SUMMARY
Discharge Summary


Discharge Date: 11/05/18


Discharging Provider: ELEANOR ERAZO


Primary Care Provider: Thomas Robles


Condition at Discharge: Stable


Discharge Disposition: 01 Home, Self Care


Discharge Facility Name: home





- DIAGNOSES


Admission Diagnoses: 





(1) Abdominal pain





(2) Hypokalemia


Discharge Diagnoses with Status of Each Condition: 


(1) Abdominal pain


controlled. all images studies including CT of abdomen, and chest, US of upper 

quadrant of abdomen are unremarkable. pt state she only take Tramadol for d/c to

home.


(2) Hypokalemia


resolved


(3) hx of hematochezia


pt did not have rectal bleeding in the hospital course. Pt report she had hx of 

hematochezia. She may followup Dr. Martin if you continue to have 

hematochezia. Pt's HGB is stable. 





- HPI


History of Present Illness: 


refer from Dr. Stringer's HPI for pt on 11/4/18 as the following 


Patient is a very healthy 28-year-old female with past medical history of 

allergic sinusitis who presented to the emergency department with a chief 

complaint of abdominal pain.  The patient states that the abdominal pain started

all of a sudden this afternoon while she was driving back to her home from 

[place], [place].  She states that prior to this she had noticed some 

right lower quadrant aching which had been going on off and on for several mo

nths but she did not make much of it.  She also states that she has noticed that

over the last month and a half she has been having blood in her stools.  She 

states that it is right red blood but she has normal formed bowel movements with

the blood in her stool.  The patient denies any diarrhea or constipation.  She 

does state that the blood has become more pronounced over the last 3 days.  She 

states that today she had an acute onset of severe right lower quadrant 

abdominal pain.  She states it initially started as an ache similar to what she 

has had in the past but then became sharp and severe.  She states that she had 

to pull over in the side of the road while she was driving because the pain was 

so severe.  She states that she had cold sweats and nausea associated with the 

pain.  She states that the pain almost brought her to tears.  She states it was 

a 10 out of 10 pain.  She states that nothing that she did helped to improve the

pain.  She states that somehow she was able to drive herself back to [place] 

and then come into the emergency department.  She denies any fevers or chills.  

She denies any history of bowel issues.  She denies any have family history of 

irritable bowel or inflammatory bowel syndrome.  The patient denies eating any 

unusual foods over the last 24 hours.  She states that the pain occurred several

hours after she ate.  She states that the pain remained constant until she 

arrived in the emergency department and was given pain medication.





Patient denies any headaches, blurred vision, runny nose, sore throat, nasal 

congestion, difficulty swallowing, changes in her appetite, recent unintentional

weight loss, shortness of air, orthopnea, PND, cough, increased lower extremity 

swelling, chest pain, vomiting, constipation, diarrhea, black stools, urinary 

urgency, urinary frequency, dysuria, joint pain, joint swelling, muscle aches, 

back pain, neck stiffness, lightheadedness, dizziness, ringing in her ears, 

recent stress or depression, night sweats or any focal neurologic deficits.





On presentation to the emergency department the patient was afebrile and 

slightly hypertensive with a blood pressure of 139/75 otherwise she had no 

respiratory distress and was saturating 100% on room air.  The patient did 

appear to be writhing in pain and immediately was given a dose of IV Toradol.  

The patient underwent routine lab work which revealed a mild hypokalemia but 

remainder of her lab work was all within normal limits.  The patient had a 

negative troponin and normal C-reactive protein, LFTs and ESR.  The patient's 

white blood cell count was 10.5.  The patient's urine analysis was negative.  

The patient underwent multiple imaging studies including a CT of her abdomen and

pelvis without contrast which was unremarkable.  She then underwent an abdominal

ultrasound which was also unremarkable.  She then underwent a CT with contrast 

of her thorax and her abdomen and pelvis this was also unremarkable.  The 

patient continued to have abdominal pain and required 2 additional doses of IV 

Dilaudid but pain persisted.  At this point the emergency room physician became 

concerned for possible inflammatory bowel disease in a patient who is been ha

ving hematochezia and now has acute onset of abdominal pain.  The emergency room

physician contacted the surgeon on call Dr. Metz who recommended that the 

patient be placed in observation and admitted by the hospitalist team and that 

he would see the patient in the morning in consultation.  He also recommended 

giving her a dose of antibiotics in the emergency department.  The patient was 

given IV ceftriaxone and Flagyl in the emergency department.  The patient was 

placed in observation for abdominal pain of unknown etiology.








- CONSULTS | PROCEDURES


Consultations: Dr. Oswald Martin





- HOSPITAL COURSE


Hospital Course: 


pt is admitted for abdominal pain. Her abdominal pain is well controlled now 

after she was treated at hospital. She state she is ready to be d/c today. All 

her image studies including CT of abdomen with and without contrast, US of 

abdomen, CTA of chest are unremarkable. Pt has hx of hematochezia, but pt did 

not have hematochezia at hospital. Surgeon Dr. Martin was consulted. Dr. Martin agreed to d/c pt and followup him if pt continue to have hematochezia.

pt is prescribed Tramadol as needed for her pain control, pt state she only take

Tramadol as pain control.








- ALLERGIES


Allergies/Adverse Reactions: 


                                    Allergies











Allergy/AdvReac Type Severity Reaction Status Date / Time


 


amoxicillin Allergy  Hives Verified 11/04/18 15:56


 


coconut oil Allergy  Hives Verified 11/04/18 15:56














- MEDICATIONS


Home Medications: 


                                Ambulatory Orders











 Medication  Instructions  Recorded  Confirmed


 


traMADol [Ultram] 50 mg PO Q4-6H PRN #20 tablet 11/05/18 














- PHYSICAL EXAM AT DISCHARGE


General Appearance: positive: No acute distress, Alert.  negative: Lethargic


Eyes Bilateral: positive: Normal inspection, PERRL, No lid inflammation, 

Conjunctivae nml


ENT: positive: ENT inspection nml, Pharynx nml, No signs of dehydration.  

negative: Purulent nasal drainage, Pharyngeal erythema, Oral lesions


Neck: positive: Nml inspection, Thyroid nml, No JVD, Trachea midline.  negative:

Thyromegaly, Lymphadenopathy (R), Lymphadenopathy (L), Stiff neck, 

Swelling/bruising, Tracheal deviation


Respiratory: positive: Chest non-tender, No respiratory distress, Breath sounds 

nml.  negative: Wheezes, Rales, Rhonchi


Cardiovascular: positive: Regular rate & rhythm, No murmur, No gallop.  

negative: Irregularly irregular, Extrasystoles, Tachycardia, Bradycardia, JVD 

present, Systolic murmur, Diastolic murmur


Peripheral Pulses: positive: 2+


Abdomen: positive: Non-tender, No organomegaly, Nml bowel sounds, No distention.

 negative: Tenderness, Guarding, Rebound


Back: positive: Nml inspection.  negative: CVA tenderness (R), CVA tenderness 

(L)


Skin: positive: Color nml, No rash, Warm, Dry.  negative: Cyanosis, Diaphoresis,

Pallor


Extremities: positive: Non-tender, Full ROM, Nml appearance.  negative: Calf 

tenderness, Joint swelling, Noe's sign/cords


Neurologic/Psychiatric: positive: Oriented x3, Motor nml, Sensation nml, 

Mood/affect nml.  negative: Weakness, Sensory loss, Facial droop, Slurred/abnml 

speech, Depressed mood/affect





- LABS


Result Diagrams: 


                                 11/05/18 13:15





                                 11/05/18 05:41





- FOLLOW UP


Follow Up: 





You may follow up your PCP in two weeks. You were admitted for abdominal pain. 

All your image studies were unremarkable. Your Abdominal pain is well controlled

now. You did not have hematochezia, rectal bleeding, at hospital course. Your 

HGB is stable. You may followup Dr. Martin if you continue to have 

hematochezia. Should your symptoms return or worsen, you may present ER or call 

[phone] for help.





- TIME SPENT


Time Spent in Discharge (Minutes): 45

## 2018-11-05 NOTE — DISCHARGE PLAN
Discharge Plan


Disposition: 01 Home, Self Care


Condition: Stable


Prescriptions: 


traMADol [Ultram] 50 mg PO Q4-6H PRN #20 tablet


 PRN Reason: Pain


Diet: Regular


Activity Restrictions: Activity as Tolerated


Shower Restrictions: No (fall precaution)


Instruction Topics:  Tramadol tablets, Abdominal Pain


Additional Instructions or Follow Up instructions: 


You may follow up your PCP in two weeks. You were admitted for abdominal pain. 

All your image studies were unremarkable. Your Abdominal pain is well controlled

now. You did not have hematochezia, rectal bleeding, at hospital course. Your 

HGB is stable. You may followup Dr. Martin if you continue to have 

hematochezia. Should your symptoms return or worsen, you may present ER or call 

911 for help.


No Smoking: If you smoke, Please STOP!  Call [phone] for help.


Follow-up with: 


Thomas Siu MD [Primary Care Provider] -

## 2018-11-06 NOTE — MISCELLANEOUS PROVIDER NOTE
Miscellaneous Provider Note





- -


Note: 


From the time the patient was admitted until she was discharged - no surgical 

disease was identified and the pain resolved.  There was NOT enough time to see 

the patient prior to her discharge and as a result she will be seen in my office

soon on follow up.  No in-hospital surgical consultation done.